# Patient Record
Sex: FEMALE | Race: OTHER
[De-identification: names, ages, dates, MRNs, and addresses within clinical notes are randomized per-mention and may not be internally consistent; named-entity substitution may affect disease eponyms.]

---

## 2018-07-31 ENCOUNTER — HOSPITAL ENCOUNTER (EMERGENCY)
Dept: HOSPITAL 54 - ER | Age: 33
Discharge: HOME | End: 2018-07-31
Payer: SELF-PAY

## 2018-07-31 VITALS — DIASTOLIC BLOOD PRESSURE: 77 MMHG | SYSTOLIC BLOOD PRESSURE: 139 MMHG

## 2018-07-31 VITALS — BODY MASS INDEX: 17.07 KG/M2 | WEIGHT: 100 LBS | HEIGHT: 64 IN

## 2018-07-31 DIAGNOSIS — F12.929: ICD-10-CM

## 2018-07-31 DIAGNOSIS — R00.0: ICD-10-CM

## 2018-07-31 DIAGNOSIS — F41.9: Primary | ICD-10-CM

## 2018-07-31 PROCEDURE — 99284 EMERGENCY DEPT VISIT MOD MDM: CPT

## 2018-07-31 PROCEDURE — A4606 OXYGEN PROBE USED W OXIMETER: HCPCS

## 2018-07-31 PROCEDURE — 93005 ELECTROCARDIOGRAM TRACING: CPT

## 2018-07-31 PROCEDURE — Z7610: HCPCS

## 2018-07-31 NOTE — NUR
A/OX3, PT IS COMPLAINING OF CHEST PAIN AFTER TAKING "EDIBLES" 30 MINUTES PTA. 
PT APPEARS ANXIOUS. GOWNED AND PLACED ON CONT CARDIAC AND POX MONITORING. 
ORDERED STAT EKG.ALL NEEDS ATE ATTENDED. WILL CONT TO MONITOR